# Patient Record
Sex: MALE | Race: WHITE | NOT HISPANIC OR LATINO | Employment: FULL TIME | URBAN - METROPOLITAN AREA
[De-identification: names, ages, dates, MRNs, and addresses within clinical notes are randomized per-mention and may not be internally consistent; named-entity substitution may affect disease eponyms.]

---

## 2023-02-24 LAB
AMPHETAMINE (PRESENCE) IN URINE BY SCREEN METHOD: NORMAL
BARBITURATES PRESENCE IN URINE BY SCREEN METHOD: NORMAL
BENZODIAZEPINE (PRESENCE) IN URINE BY SCREEN METHOD: NORMAL
CANNABINOIDS IN URINE BY SCREEN METHOD: NORMAL
COCAINE (PRESENCE) IN URINE BY SCREEN METHOD: NORMAL
DRUG SCREEN COMMENT URINE: NORMAL
FENTANYL URINE: NORMAL
METHADONE (PRESENCE) IN URINE BY SCREEN METHOD: NORMAL
OPIATES (PRESENCE) IN URINE BY SCREEN METHOD: NORMAL
OXYCODONE (PRESENCE) IN URINE BY SCREEN METHOD: NORMAL
PHENCYCLIDINE (PRESENCE) IN URINE BY SCREEN METHOD: NORMAL

## 2023-02-28 LAB — ETHYL GLUCURONIDE SCREEN: NEGATIVE NG/ML

## 2023-03-01 LAB
6-ACETYLMORPHINE: <25 NG/ML
7-AMINOCLONAZEPAM: <25 NG/ML
ALPHA-HYDROXYALPRAZOLAM: <25 NG/ML
ALPHA-HYDROXYMIDAZOLAM: <25 NG/ML
ALPRAZOLAM: <25 NG/ML
CHLORDIAZEPOXIDE: <25 NG/ML
CLONAZEPAM: <25 NG/ML
CODEINE: <50 NG/ML
DIAZEPAM: <25 NG/ML
HYDROCODONE: <25 NG/ML
HYDROMORPHONE: <25 NG/ML
LORAZEPAM: <25 NG/ML
MIDAZOLAM: <25 NG/ML
MORPHINE URINE: <50 NG/ML
NORDIAZEPAM: <25 NG/ML
NORHYDROCODONE: <25 NG/ML
NOROXYCODONE: <25 NG/ML
OXAZEPAM: <25 NG/ML
OXYCODONE: <25 NG/ML
OXYMORPHONE: <25 NG/ML
TEMAZEPAM: <25 NG/ML

## 2023-03-05 LAB — ETHYL GLUCURONIDE SCREEN: NEGATIVE NG/ML

## 2023-03-13 LAB — ETHYL GLUCURONIDE SCREEN: NEGATIVE NG/ML

## 2023-03-17 LAB — ETHYL GLUCURONIDE SCREEN: NEGATIVE NG/ML

## 2023-03-27 LAB
6-ACETYLMORPHINE: <25 NG/ML
7-AMINOCLONAZEPAM: <25 NG/ML
ALPHA-HYDROXYALPRAZOLAM: <25 NG/ML
ALPHA-HYDROXYMIDAZOLAM: <25 NG/ML
ALPRAZOLAM: <25 NG/ML
CHLORDIAZEPOXIDE: <25 NG/ML
CLONAZEPAM: <25 NG/ML
CODEINE: <50 NG/ML
DIAZEPAM: <25 NG/ML
ETHYL GLUCURONIDE SCREEN: NEGATIVE NG/ML
HYDROCODONE: <25 NG/ML
HYDROMORPHONE: <25 NG/ML
LORAZEPAM: <25 NG/ML
MIDAZOLAM: <25 NG/ML
MORPHINE URINE: <50 NG/ML
NORDIAZEPAM: <25 NG/ML
NORHYDROCODONE: <25 NG/ML
NOROXYCODONE: <25 NG/ML
OXAZEPAM: <25 NG/ML
OXYCODONE: <25 NG/ML
OXYMORPHONE: <25 NG/ML
TEMAZEPAM: <25 NG/ML

## 2023-03-30 LAB
6-ACETYLMORPHINE: <25 NG/ML
7-AMINOCLONAZEPAM: <25 NG/ML
ALPHA-HYDROXYALPRAZOLAM: <25 NG/ML
ALPHA-HYDROXYMIDAZOLAM: <25 NG/ML
ALPRAZOLAM: <25 NG/ML
CHLORDIAZEPOXIDE: <25 NG/ML
CLONAZEPAM: <25 NG/ML
CODEINE: <50 NG/ML
DIAZEPAM: <25 NG/ML
ETHYL GLUCURONIDE SCREEN: POSITIVE NG/ML
HYDROCODONE: <25 NG/ML
HYDROMORPHONE: <25 NG/ML
LORAZEPAM: <25 NG/ML
MIDAZOLAM: <25 NG/ML
MORPHINE URINE: <50 NG/ML
NORDIAZEPAM: <25 NG/ML
NORHYDROCODONE: <25 NG/ML
NOROXYCODONE: <25 NG/ML
OXAZEPAM: <25 NG/ML
OXYCODONE: <25 NG/ML
OXYMORPHONE: <25 NG/ML
TEMAZEPAM: <25 NG/ML

## 2023-04-01 LAB
ETHYL GLUCURONIDE: 1121 NG/ML
ETHYL SULFATE,U: 676 NG/ML

## 2023-04-06 LAB — ETHYL GLUCURONIDE SCREEN: POSITIVE NG/ML

## 2023-04-10 LAB
ETHYL GLUCURONIDE: 774 NG/ML
ETHYL SULFATE,U: 574 NG/ML

## 2023-04-17 LAB — ETHYL GLUCURONIDE SCREEN: NEGATIVE NG/ML

## 2023-04-19 LAB
6-ACETYLMORPHINE: <25 NG/ML
7-AMINOCLONAZEPAM: <25 NG/ML
ALPHA-HYDROXYALPRAZOLAM: <25 NG/ML
ALPHA-HYDROXYMIDAZOLAM: <25 NG/ML
ALPRAZOLAM: <25 NG/ML
AMPHETAMINE (PRESENCE) IN URINE BY SCREEN METHOD: NORMAL
BARBITURATES PRESENCE IN URINE BY SCREEN METHOD: NORMAL
BENZODIAZEPINE (PRESENCE) IN URINE BY SCREEN METHOD: NORMAL
CANNABINOIDS IN URINE BY SCREEN METHOD: NORMAL
CHLORDIAZEPOXIDE: <25 NG/ML
CLONAZEPAM: <25 NG/ML
COCAINE (PRESENCE) IN URINE BY SCREEN METHOD: NORMAL
CODEINE: <50 NG/ML
DIAZEPAM: <25 NG/ML
DRUG SCREEN COMMENT URINE: NORMAL
FENTANYL URINE: NORMAL
HYDROCODONE: <25 NG/ML
HYDROMORPHONE: <25 NG/ML
LORAZEPAM: <25 NG/ML
METHADONE (PRESENCE) IN URINE BY SCREEN METHOD: NORMAL
MIDAZOLAM: <25 NG/ML
MORPHINE URINE: <50 NG/ML
NORDIAZEPAM: <25 NG/ML
NORHYDROCODONE: <25 NG/ML
NOROXYCODONE: <25 NG/ML
OPIATES (PRESENCE) IN URINE BY SCREEN METHOD: NORMAL
OXAZEPAM: <25 NG/ML
OXYCODONE (PRESENCE) IN URINE BY SCREEN METHOD: NORMAL
OXYCODONE: <25 NG/ML
OXYMORPHONE: <25 NG/ML
PHENCYCLIDINE (PRESENCE) IN URINE BY SCREEN METHOD: NORMAL
TEMAZEPAM: <25 NG/ML

## 2023-04-21 LAB — ETHYL GLUCURONIDE SCREEN: NEGATIVE NG/ML

## 2023-05-22 LAB — ETHYL GLUCURONIDE SCREEN: NEGATIVE NG/ML

## 2023-06-26 ENCOUNTER — TELEPHONE (OUTPATIENT)
Dept: PRIMARY CARE | Facility: CLINIC | Age: 43
End: 2023-06-26
Payer: COMMERCIAL

## 2023-06-26 DIAGNOSIS — Z00.00 LABORATORY TESTS ORDERED AS PART OF A COMPLETE PHYSICAL EXAM (CPE): ICD-10-CM

## 2023-06-26 LAB — ETHYL GLUCURONIDE SCREEN: NEGATIVE NG/ML

## 2023-07-06 ENCOUNTER — APPOINTMENT (OUTPATIENT)
Dept: PRIMARY CARE | Facility: CLINIC | Age: 43
End: 2023-07-06
Payer: COMMERCIAL

## 2023-07-11 ENCOUNTER — LAB (OUTPATIENT)
Dept: LAB | Facility: LAB | Age: 43
End: 2023-07-11
Payer: COMMERCIAL

## 2023-07-11 DIAGNOSIS — Z00.00 LABORATORY TESTS ORDERED AS PART OF A COMPLETE PHYSICAL EXAM (CPE): ICD-10-CM

## 2023-07-11 LAB
ALANINE AMINOTRANSFERASE (SGPT) (U/L) IN SER/PLAS: 24 U/L (ref 10–52)
ALBUMIN (G/DL) IN SER/PLAS: 4.6 G/DL (ref 3.4–5)
ALKALINE PHOSPHATASE (U/L) IN SER/PLAS: 45 U/L (ref 33–120)
ANION GAP IN SER/PLAS: 14 MMOL/L (ref 10–20)
ASPARTATE AMINOTRANSFERASE (SGOT) (U/L) IN SER/PLAS: 22 U/L (ref 9–39)
BILIRUBIN TOTAL (MG/DL) IN SER/PLAS: 0.6 MG/DL (ref 0–1.2)
CALCIUM (MG/DL) IN SER/PLAS: 9.4 MG/DL (ref 8.6–10.6)
CARBON DIOXIDE, TOTAL (MMOL/L) IN SER/PLAS: 25 MMOL/L (ref 21–32)
CHLORIDE (MMOL/L) IN SER/PLAS: 102 MMOL/L (ref 98–107)
CHOLESTEROL (MG/DL) IN SER/PLAS: 258 MG/DL (ref 0–199)
CHOLESTEROL IN HDL (MG/DL) IN SER/PLAS: 59.9 MG/DL
CHOLESTEROL/HDL RATIO: 4.3
CREATININE (MG/DL) IN SER/PLAS: 0.94 MG/DL (ref 0.5–1.3)
ERYTHROCYTE DISTRIBUTION WIDTH (RATIO) BY AUTOMATED COUNT: 14 % (ref 11.5–14.5)
ERYTHROCYTE MEAN CORPUSCULAR HEMOGLOBIN CONCENTRATION (G/DL) BY AUTOMATED: 33 G/DL (ref 32–36)
ERYTHROCYTE MEAN CORPUSCULAR VOLUME (FL) BY AUTOMATED COUNT: 85 FL (ref 80–100)
ERYTHROCYTES (10*6/UL) IN BLOOD BY AUTOMATED COUNT: 5.61 X10E12/L (ref 4.5–5.9)
ESTIMATED AVERAGE GLUCOSE FOR HBA1C: 108 MG/DL
GFR MALE: >90 ML/MIN/1.73M2
GLUCOSE (MG/DL) IN SER/PLAS: 92 MG/DL (ref 74–99)
HEMATOCRIT (%) IN BLOOD BY AUTOMATED COUNT: 47.9 % (ref 41–52)
HEMOGLOBIN (G/DL) IN BLOOD: 15.8 G/DL (ref 13.5–17.5)
HEMOGLOBIN A1C/HEMOGLOBIN TOTAL IN BLOOD: 5.4 %
LDL: 164 MG/DL (ref 0–99)
LEUKOCYTES (10*3/UL) IN BLOOD BY AUTOMATED COUNT: 4.9 X10E9/L (ref 4.4–11.3)
NRBC (PER 100 WBCS) BY AUTOMATED COUNT: 0 /100 WBC (ref 0–0)
PLATELETS (10*3/UL) IN BLOOD AUTOMATED COUNT: 183 X10E9/L (ref 150–450)
POTASSIUM (MMOL/L) IN SER/PLAS: 4 MMOL/L (ref 3.5–5.3)
PROTEIN TOTAL: 6.9 G/DL (ref 6.4–8.2)
SODIUM (MMOL/L) IN SER/PLAS: 137 MMOL/L (ref 136–145)
THYROTROPIN (MIU/L) IN SER/PLAS BY DETECTION LIMIT <= 0.05 MIU/L: 1.59 MIU/L (ref 0.44–3.98)
TRIGLYCERIDE (MG/DL) IN SER/PLAS: 170 MG/DL (ref 0–149)
UREA NITROGEN (MG/DL) IN SER/PLAS: 17 MG/DL (ref 6–23)
VLDL: 34 MG/DL (ref 0–40)

## 2023-07-11 PROCEDURE — 80061 LIPID PANEL: CPT

## 2023-07-11 PROCEDURE — 83036 HEMOGLOBIN GLYCOSYLATED A1C: CPT

## 2023-07-11 PROCEDURE — 36415 COLL VENOUS BLD VENIPUNCTURE: CPT

## 2023-07-11 PROCEDURE — 85027 COMPLETE CBC AUTOMATED: CPT

## 2023-07-11 PROCEDURE — 80053 COMPREHEN METABOLIC PANEL: CPT

## 2023-07-11 PROCEDURE — 84443 ASSAY THYROID STIM HORMONE: CPT

## 2023-07-19 ENCOUNTER — OFFICE VISIT (OUTPATIENT)
Dept: PRIMARY CARE | Facility: CLINIC | Age: 43
End: 2023-07-19
Payer: COMMERCIAL

## 2023-07-19 VITALS
BODY MASS INDEX: 32.64 KG/M2 | HEART RATE: 68 BPM | TEMPERATURE: 98.2 F | RESPIRATION RATE: 18 BRPM | DIASTOLIC BLOOD PRESSURE: 86 MMHG | OXYGEN SATURATION: 97 % | WEIGHT: 215.4 LBS | HEIGHT: 68 IN | SYSTOLIC BLOOD PRESSURE: 126 MMHG

## 2023-07-19 DIAGNOSIS — Z00.00 ENCOUNTER FOR ANNUAL PHYSICAL EXAM: ICD-10-CM

## 2023-07-19 DIAGNOSIS — G43.009 MIGRAINE WITHOUT AURA AND WITHOUT STATUS MIGRAINOSUS, NOT INTRACTABLE: ICD-10-CM

## 2023-07-19 DIAGNOSIS — Z12.11 COLON CANCER SCREENING: Primary | ICD-10-CM

## 2023-07-19 DIAGNOSIS — I51.7 LVH (LEFT VENTRICULAR HYPERTROPHY): ICD-10-CM

## 2023-07-19 DIAGNOSIS — E78.2 MIXED HYPERLIPIDEMIA: ICD-10-CM

## 2023-07-19 PROCEDURE — 99396 PREV VISIT EST AGE 40-64: CPT | Performed by: FAMILY MEDICINE

## 2023-07-19 PROCEDURE — 1036F TOBACCO NON-USER: CPT | Performed by: FAMILY MEDICINE

## 2023-07-19 NOTE — PROGRESS NOTES
"Subjective   Osorio Gómez III is a 43 y.o. male who presents for Annual Exam (Pt being seen for complete physical exam.).  HPI  PMH:  Bicuspid aortic valve Dx in HS   L inguinal hernia repair  Achilles tendon rupture   Ca score 0, 8/2022  Mild LVH-echo 8/2022 cards eval. Rec rpt few yrs   Migraines-neg MRI 8/2022, neuro eval   wife, AUGUSTA Verduzco  4 kid    Wants to lose 20 lb. Works out 3 times a week  Drinks on weekends. Was getting drug tested for issue in the past   Worried about cholesterol   Mood is good  No immed Fhx colon prostate or melanoma ca  Migraines worse when on the computer   No current outpatient medications on file prior to visit.     No current facility-administered medications on file prior to visit.                  Objective   /86   Pulse 68   Temp 36.8 °C (98.2 °F)   Resp 18   Ht 1.715 m (5' 7.52\")   Wt 97.7 kg (215 lb 6.4 oz)   SpO2 97%   BMI 33.22 kg/m²    Physical Exam  General: NAD  HEENT:NCAT, PERRLA, nml OP, b/l TM clear   Neck: no cervical ALLISON  Heart: RRR no murmur, no edema   Lungs: CTA b/l, no wheeze or rhonchi   GI: abd soft, nontender, nondistended.   MSK: no c/c/e. nml gait   Skin: warm and dry  Psych: cooperative, appropriate affect  Neuro: speech clear. A&Ox3  Assessment/Plan   Problem List Items Addressed This Visit    None  Visit Diagnoses       Colon cancer screening    -  Primary    Relevant Orders    Colonoscopy    Mixed hyperlipidemia      -ASCVD 3.2 w Ca score 0 2022. Statin not indicated but better if LDL <160. \-mmotivated to lose wt and make diet changes  -rpt lipids in 3 mo       Relevant Orders    Lipid Panel    Encounter for annual physical exam      CPE:overall doing well. Discussed diet/ex changes  BMI: 33  VACC: reviewed, tdap UTD  COLON CA SCRN: RF c-scope  LABS: reviewed w pt at goal besides lipids  Prostate ca scrn: PSA at 53  RTC yearly or prn for CPE w labs prior     Relevant Orders    CBC and Auto Differential    Comprehensive Metabolic Panel    " Hemoglobin A1C    Lipid Panel    TSH with reflex to Free T4 if abnormal    Migraines: neg neuro w/up including MRI  -exac by screens, rec blue light glasses    BP on recheck much improved    LVH: mild, rpt echo in 2-3 yrs per cards

## 2023-09-29 ENCOUNTER — TELEMEDICINE (OUTPATIENT)
Dept: PRIMARY CARE | Facility: CLINIC | Age: 43
End: 2023-09-29
Payer: COMMERCIAL

## 2023-09-29 DIAGNOSIS — L03.119 CELLULITIS, UPPER ARM: Primary | ICD-10-CM

## 2023-09-29 PROCEDURE — 99213 OFFICE O/P EST LOW 20 MIN: CPT | Performed by: FAMILY MEDICINE

## 2023-09-29 RX ORDER — SULFAMETHOXAZOLE AND TRIMETHOPRIM 800; 160 MG/1; MG/1
1 TABLET ORAL 2 TIMES DAILY
Qty: 14 TABLET | Refills: 0 | Status: SHIPPED | OUTPATIENT
Start: 2023-09-29 | End: 2023-10-06

## 2023-09-29 NOTE — PROGRESS NOTES
Subjective   Osorio Gómez III is a 43 y.o. male who presents for No chief complaint on file..  HPI  An interactive audio and video telecommunication system which permits real time communications between the patient  (at the originating site) and provider (at the distant site) was utilized to provide this telehealth service. Verbal consent was requested and obtained today for a telehealth visit.    Bee sting 2 day ago L elbow area, getting swollen and itchy. No prevous Hx of rxn to bee stings, no fever, sob, tongue swelling, no streaking. Slight pain   No current outpatient medications on file prior to visit.     No current facility-administered medications on file prior to visit.                  Objective   There were no vitals taken for this visit.   Physical Exam  General: NAD  HEENT:NCAT  Lungs: no audible wheeze or rhonchi   Skin: L ELBOW AREA W ERYTHEMA AND SWELLING, NO VISIBLE STREAKING   Psych: cooperative, appropriate affect  Neuro: speech clear. A&Ox3    Assessment/Plan   Problem List Items Addressed This Visit    None  Visit Diagnoses       Cellulitis, upper arm    -  Primary  -bee sting concern for cellulitis also allergic component  -start bactrim DS BID x 7 d  -benadryl +/- zyrtec  -topical benadryl and hydrocort cream     Relevant Medications    sulfamethoxazole-trimethoprim (Bactrim DS) 800-160 mg tablet

## 2023-12-08 DIAGNOSIS — Z12.11 COLON CANCER SCREENING: ICD-10-CM

## 2023-12-08 RX ORDER — POLYETHYLENE GLYCOL 3350, SODIUM SULFATE ANHYDROUS, SODIUM BICARBONATE, SODIUM CHLORIDE, POTASSIUM CHLORIDE 236; 22.74; 6.74; 5.86; 2.97 G/4L; G/4L; G/4L; G/4L; G/4L
4000 POWDER, FOR SOLUTION ORAL ONCE
Qty: 4000 ML | Refills: 0 | Status: SHIPPED | OUTPATIENT
Start: 2023-12-08 | End: 2023-12-08

## 2024-01-24 ENCOUNTER — OFFICE VISIT (OUTPATIENT)
Dept: PRIMARY CARE | Facility: CLINIC | Age: 44
End: 2024-01-24
Payer: COMMERCIAL

## 2024-01-24 VITALS
OXYGEN SATURATION: 95 % | BODY MASS INDEX: 32.33 KG/M2 | WEIGHT: 206 LBS | SYSTOLIC BLOOD PRESSURE: 131 MMHG | RESPIRATION RATE: 16 BRPM | DIASTOLIC BLOOD PRESSURE: 92 MMHG | HEIGHT: 67 IN | TEMPERATURE: 97.8 F | HEART RATE: 93 BPM

## 2024-01-24 DIAGNOSIS — B96.89 ACUTE BACTERIAL BRONCHITIS: Primary | ICD-10-CM

## 2024-01-24 DIAGNOSIS — J20.8 ACUTE BACTERIAL BRONCHITIS: Primary | ICD-10-CM

## 2024-01-24 PROCEDURE — 1036F TOBACCO NON-USER: CPT | Performed by: FAMILY MEDICINE

## 2024-01-24 PROCEDURE — 99213 OFFICE O/P EST LOW 20 MIN: CPT | Performed by: FAMILY MEDICINE

## 2024-01-24 RX ORDER — AZITHROMYCIN 250 MG/1
TABLET, FILM COATED ORAL
Qty: 6 TABLET | Refills: 0 | Status: SHIPPED | OUTPATIENT
Start: 2024-01-24 | End: 2024-01-29

## 2024-01-24 RX ORDER — METHYLPREDNISOLONE 4 MG/1
TABLET ORAL
Qty: 21 TABLET | Refills: 0 | Status: SHIPPED | OUTPATIENT
Start: 2024-01-24 | End: 2024-01-31

## 2024-01-24 NOTE — PROGRESS NOTES
"Subjective   Osorio Gómez III is a 43 y.o. male who presents for Cough (For 3 weeks ), Nasal Congestion, and Chest Pain (Pressure in chest ).  HPI  Cough and fever started 3 wk ago, cough is nagging. No sob. Worked out a few  times. No OTC meds in 2 wk   No current outpatient medications on file prior to visit.     No current facility-administered medications on file prior to visit.                  Objective   BP (!) 131/92 (BP Location: Left arm)   Pulse 93   Temp 36.6 °C (97.8 °F)   Resp 16   Ht 1.702 m (5' 7\")   Wt 93.4 kg (206 lb)   SpO2 95%   BMI 32.26 kg/m²    Physical Exam  General: NAD  HEENT:NCAT, PERRLA, nml OP, bl TM clear. NT Edema, no sinus pain   Neck: no cervical ALLISON  Heart: RRR no murmur, no edema   Lungs: CTA b/l, no wheeze or rhonchi   MSK: no c/c/e. nml gait   Skin: warm and dry  Psych: cooperative, appropriate affect  Neuro: speech clear. A&Ox3  Assessment/Plan   Problem List Items Addressed This Visit    None  Visit Diagnoses       Acute bacterial bronchitis    -  Primary  Persistent cough x 4 wk concerning for bronchitis vs atyp PNA  Zpack and medrol   CXR if no improvement       Relevant Medications    azithromycin (Zithromax) 250 mg tablet    methylPREDNISolone (Medrol Dospak) 4 mg tablets            "

## 2024-02-07 ENCOUNTER — PATIENT MESSAGE (OUTPATIENT)
Dept: PRIMARY CARE | Facility: CLINIC | Age: 44
End: 2024-02-07
Payer: COMMERCIAL

## 2024-03-07 ENCOUNTER — HOSPITAL ENCOUNTER (OUTPATIENT)
Dept: RADIOLOGY | Facility: HOSPITAL | Age: 44
Discharge: HOME | End: 2024-03-07
Payer: COMMERCIAL

## 2024-03-07 DIAGNOSIS — R05.3 CHRONIC COUGH: ICD-10-CM

## 2024-03-07 PROCEDURE — 71046 X-RAY EXAM CHEST 2 VIEWS: CPT | Performed by: RADIOLOGY

## 2024-03-07 PROCEDURE — 71046 X-RAY EXAM CHEST 2 VIEWS: CPT

## 2024-03-08 ENCOUNTER — TELEPHONE (OUTPATIENT)
Dept: PRIMARY CARE | Facility: CLINIC | Age: 44
End: 2024-03-08
Payer: COMMERCIAL

## 2024-03-08 DIAGNOSIS — J18.9 MULTIFOCAL PNEUMONIA: Primary | ICD-10-CM

## 2024-03-08 RX ORDER — LEVOFLOXACIN 500 MG/1
500 TABLET, FILM COATED ORAL
Qty: 10 TABLET | Refills: 0 | Status: SHIPPED | OUTPATIENT
Start: 2024-03-08 | End: 2024-03-18

## 2024-03-09 NOTE — TELEPHONE ENCOUNTER
CXR w multifocal PNA  Called pt LVM  Called wife, informed of findings. Pt has cough and night sweats but no extreme sob. able to work  Advised to monitor O2 and monitor for red flag Sx   Sent in levaquin 500 daily for 10 d   Rpt CXR 2-3 wk

## 2024-03-11 ENCOUNTER — APPOINTMENT (OUTPATIENT)
Dept: GASTROENTEROLOGY | Facility: EXTERNAL LOCATION | Age: 44
End: 2024-03-11
Payer: COMMERCIAL

## 2024-03-12 ENCOUNTER — TELEPHONE (OUTPATIENT)
Dept: PRIMARY CARE | Facility: CLINIC | Age: 44
End: 2024-03-12
Payer: COMMERCIAL

## 2024-03-12 NOTE — TELEPHONE ENCOUNTER
Contacted pt on 3/9 prior to starting levaquin. Having night sweats and cough but no extreme SOB. O2 around 94. Was at his son basketball game.    Contacted wife 3/11. Feeling better since starting abx. Call w changes/concerns

## 2024-03-19 ENCOUNTER — TELEPHONE (OUTPATIENT)
Dept: PRIMARY CARE | Facility: CLINIC | Age: 44
End: 2024-03-19
Payer: COMMERCIAL

## 2024-03-19 NOTE — TELEPHONE ENCOUNTER
----- Message from Mita Matos CMA sent at 3/19/2024 11:54 AM EDT -----  Regarding: FW: Follow up  Contact: [phone]    ----- Message -----  From: Osorio Gómez III  Sent: 3/19/2024   9:36 AM EDT  To: [facility] [place] [id number] Clinical Support Staff  Subject: Follow up                                        Hi Dr. Behm! Just wanted to to talk about my progress and next steps. I feel much better so just had a few questions moving forward    Thanks  Edouard Gómez  [phone]

## 2024-03-19 NOTE — TELEPHONE ENCOUNTER
Called pt  Feeling better from PNA. Went to gym today. Completed levaquin 500 x 10 d   Occ cough  Still get f/up CXR next wk

## 2024-03-22 ENCOUNTER — HOSPITAL ENCOUNTER (OUTPATIENT)
Dept: RADIOLOGY | Facility: HOSPITAL | Age: 44
Discharge: HOME | End: 2024-03-22
Payer: COMMERCIAL

## 2024-03-22 DIAGNOSIS — J18.9 MULTIFOCAL PNEUMONIA: ICD-10-CM

## 2024-03-22 PROCEDURE — 71046 X-RAY EXAM CHEST 2 VIEWS: CPT

## 2024-03-22 PROCEDURE — 71046 X-RAY EXAM CHEST 2 VIEWS: CPT | Performed by: RADIOLOGY

## 2024-03-25 ENCOUNTER — TELEPHONE (OUTPATIENT)
Dept: PRIMARY CARE | Facility: CLINIC | Age: 44
End: 2024-03-25
Payer: COMMERCIAL

## 2024-03-25 DIAGNOSIS — J18.9 MULTIFOCAL PNEUMONIA: Primary | ICD-10-CM

## 2024-03-25 NOTE — TELEPHONE ENCOUNTER
Called pt  CXR still w PNA but clinically MUCH improved  Will give more time sine xray can lag clinically   Rpt CXR in 1 wk   If no improvement will get CT

## 2024-03-28 ENCOUNTER — TELEPHONE (OUTPATIENT)
Dept: PRIMARY CARE | Facility: CLINIC | Age: 44
End: 2024-03-28
Payer: COMMERCIAL

## 2024-03-28 DIAGNOSIS — J18.9 MULTIFOCAL PNEUMONIA: Primary | ICD-10-CM

## 2024-03-28 NOTE — TELEPHONE ENCOUNTER
After further consideration pt/wife requested to move fwd w CT which is appropriate given severity of PNA and no resolution on previous CXR

## 2024-04-09 ENCOUNTER — OFFICE VISIT (OUTPATIENT)
Dept: GASTROENTEROLOGY | Facility: EXTERNAL LOCATION | Age: 44
End: 2024-04-09
Payer: COMMERCIAL

## 2024-04-09 DIAGNOSIS — Z12.11 COLON CANCER SCREENING: ICD-10-CM

## 2024-04-09 DIAGNOSIS — K64.8 INTERNAL HEMORRHOIDS: Primary | ICD-10-CM

## 2024-04-09 DIAGNOSIS — K57.30 DIVERTICULOSIS OF LARGE INTESTINE WITHOUT DIVERTICULITIS: ICD-10-CM

## 2024-04-09 PROCEDURE — 45378 DIAGNOSTIC COLONOSCOPY: CPT | Performed by: INTERNAL MEDICINE

## 2024-04-13 ENCOUNTER — HOSPITAL ENCOUNTER (OUTPATIENT)
Dept: RADIOLOGY | Facility: CLINIC | Age: 44
Discharge: HOME | End: 2024-04-13
Payer: COMMERCIAL

## 2024-04-13 DIAGNOSIS — J18.9 MULTIFOCAL PNEUMONIA: ICD-10-CM

## 2024-04-13 PROCEDURE — 71250 CT THORAX DX C-: CPT | Performed by: RADIOLOGY

## 2024-04-13 PROCEDURE — 71250 CT THORAX DX C-: CPT

## 2024-04-16 ENCOUNTER — TELEPHONE (OUTPATIENT)
Dept: PRIMARY CARE | Facility: CLINIC | Age: 44
End: 2024-04-16
Payer: COMMERCIAL

## 2024-04-16 DIAGNOSIS — J18.9 MULTIFOCAL PNEUMONIA: Primary | ICD-10-CM

## 2024-04-16 NOTE — TELEPHONE ENCOUNTER
Called wife and patient   Sx improved overall but still w occ cough  PNA changes seen.   Will RF to pulm     Micronodule noted, low risk pt nonsmoker. Consider rpt CT 1 yr     Diverticulosis seen. Benign, no constipation

## 2024-04-26 DIAGNOSIS — J18.9 PNEUMONIA OF BOTH LUNGS DUE TO INFECTIOUS ORGANISM, UNSPECIFIED PART OF LUNG: Primary | ICD-10-CM

## 2024-04-26 RX ORDER — DOXYCYCLINE 100 MG/1
100 TABLET ORAL 2 TIMES DAILY
COMMUNITY
End: 2024-04-26 | Stop reason: SDUPTHER

## 2024-04-26 NOTE — PROGRESS NOTES
Patient: Osorio Gómez III    [id number]  : 1980 -- AGE 43 y.o.    Provider: Capri CHAPARRO [facility]     Location [facility]   Service Date: 24              [facility]  New Visit Note      HISTORY OF PRESENT ILLNESS     The patient's referring provider is: Behm, Brittany, DO    HISTORY OF PRESENT ILLNESS   Osorio Gómez III is a 43 y.o. male with no prior medical history, who is a never smoker, who presents to a [facility] for an initial/follow up evaluation for multifocal pneumonia and chronic cough.      I have independently interviewed and examined the patient in the office and reviewed available records.    Current History    On today's visit, the patient reports since the first of the year states he has had:  24: Zpack and medrol dose for cough x 3 weeks  3/8/34: pneumonia on CXR levaquin 500 mg for 10 days.   24: Cough returned started Doxy 100 mg. CT Chest.     He is accompanied by his wife today who claims he always has a dry nagging cough.  She reports that he coughs in his sleep.  He denies coughing or choking on food or liquids.  Occasional chest tightness  Denies fevers/chills, shortness of breath, emergency room visits or breathing issues  Has occasional GERD states with busy lifestyle sometimes he goes too long without eating and then will noticed acid reflux in the evening.  Denies joint swelling, redness and stiffness.    Bicuspid aortic valve    Previous pulmonary history: He has no history of recurrent infections, or lung disease as a child.  He had no previous lung hx, never on oxygen or inhaler therapy.     Inhalers/nebulized medications: never    Hospitalization History: He has not been hospitalized over the last year for breathing related problem.    Sleep history: Sleep study showed mild sleep apnea, does not wear CPAP    ALLERGIES AND MEDICATIONS     ALLERGIES  No Known  Allergies    MEDICATIONS  No current outpatient medications on file.     No current facility-administered medications for this visit.         PAST HISTORY     PAST MEDICAL HISTORY      PAST SURGICAL HISTORY  Past Surgical History:   Procedure Laterality Date    HERNIA REPAIR  03/22/2016    Hernia Repair    MR HEAD ANGIO WO IV CONTRAST  12/23/2022    MR HEAD ANGIO WO IV CONTRAST 12/23/2022 [facility]    OTHER SURGICAL HISTORY  08/23/2019    Achilles tendon surgery       IMMUNIZATION HISTORY    There is no immunization history on file for this patient.    SOCIAL HISTORY  Tobacco Smoking: never. No vaping.  Illicit drugs: none  Alcohol consumption: weekends drinks beer  Pets: none    OCCUPATIONAL/ENVIRONMENTAL HISTORY  Occupation:  for [employer].  No known exposure to asbestos, silica, beryllium or inhaled metals.  No exposure to birds or exotic animals.    FAMILY HISTORY    Mother- emphysema and COPD   No family history of cancer.  No family history of autoimmune disorders.    REVIEW OF SYSTEMS     REVIEW OF SYSTEMS  Review of Systems    Constitutional: No fever, no chills, no night sweats.    Eyes: No double vision, no floaters, no dry eyes.   ENT: See HPI.   Neck: No neck stiffness.  Cardiovascular: No sharp chest pain, no heart racing, no leg swelling.  Respiratory: as noted in HPI.   Gastrointestinal: No nausea, no vomiting, no diarrhea.   Musculoskeletal: No joint pain, no back pain.   Integumentary: No rashes or sores.  Neurological: No dizziness, no headaches. Sleeping well.  Psychiatric: No mood changes.   Endocrine: No hot flashes, no cold intolerance, weight is stable.  Hematologic: No easy bruising or bleeding.    PHYSICAL EXAM     VITAL SIGNS:   Vitals:    05/02/24 0844   BP: 124/85   Pulse: 74   Temp: 36.8 °C (98.2 °F)   SpO2: 96%         CURRENT WEIGHT: Body mass index is 31.93 kg/m².    PREVIOUS WEIGHTS:  Wt Readings from Last 3 Encounters:   01/24/24 93.4 kg (206 lb)   07/19/23  97.7 kg (215 lb 6.4 oz)   11/21/22 99.5 kg (219 lb 6 oz)       Physical Exam    Constitutional: General appearance: Alert and oriented.  No acute distress. Well developed, well nourished.  Head and face: Symmetric  ENT: external inspection of ear and nose normal. No intranasal polyps. No oropharyngeal exudates.    Oropharynx: normal   Neck: supple, no lymphadenopathy  Pulmonary: Chest is normal. No increased work of breathing or signs of respiratory distress. Crackles mid and lower lobes bilaterally - no wheezing, or rhonchi.   Cardiovascular: Heart rate and rhythm normal. Normal S1, S2 - no murmurs, gallops, or pericardial rub.   Abdomen: Soft, non tender, +BS  Extremities: No edema. No clubbing or cyanosis of the fingernails.    Neurologic: Moves all four extremities   MSK: Normal movements of extremities. Gait normal   Psychiatric: Intact judgement and insight.    RESULTS/DATA     Pulmonary Function Test Results     No testing done.    Chest Radiograph     XR chest 2 views 03/22/2024    Narrative  Interpreted By:  Thanh Benavidez,  STUDY:  XR CHEST 2 VIEWS    INDICATION:  Signs/Symptoms:PNA f/up.    COMPARISON:  March 7    ACCESSION NUMBER(S):  [id number]    ORDERING CLINICIAN:  BRITTANY BEHM    FINDINGS:  Bilateral basilar multifocal opacities consistent with pneumonia  unchanged from prior study.    No effusion or pneumothorax.    Impression  Bilateral multifocal basilar pneumonia unchanged.    Signed by: Thanh Benavidez 3/23/2024 4:39 PM  Dictation workstation:   [id number]      Chest CT Scan     Study Date:       8/5/2022             Referring Physician: BRITTANY BEHM  MRN/PID:          [id number]             PCP:  Accession/Order#: [id number]         Department Location: [facility]                                                              Echo Lab  YOB: 1980             Fellow:  Gender:           M                    Nurse:  Admit Date:                            Sonographer:          Barbara Guerrier 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     Study Detail: The following Echo studies were performed: 2D, M-Mode, Doppler and                color flow.        PHYSICIAN INTERPRETATION:  Left Ventricle: The left ventricular systolic function is hyperdynamic, with an estimated ejection fraction of 70-75%. There are no regional wall motion abnormalities. The left ventricular cavity size is normal. The left ventricular septal wall thickness is moderately increased. There is mildly increased left ventricular posterior wall thickness. There is mild to moderate concentric left ventricular hypertrophy. Spectral Doppler shows a normal pattern of left ventricular diastolic filling. There is a left ventricular outflow tract obstruction with the Valsalva maneuver. The provoked gradient is 29 mmHg.  Left Atrium: The left atrium is normal in size.  Right Ventricle: The right ventricle is normal in size. There is normal right ventricular global systolic function.  Right Atrium: The right atrium is upper limits of normal in size.  Aortic Valve: The aortic valve appears abnormal. The number of aortic cusps could not be determined from this study. There are increased aortic valve velocities due to increased flow/dynamic ejection. There is trace to mild aortic valve regurgitation. The peak instantaneous gradient of the aortic valve is 11.9 mmHg.  The mean gradient of the aortic valve is 6.5 mmHg. The aorta is normal.  Mitral Valve: The mitral valve is normal in structure. There is mild systolic anterior motion with obstructive septal contact. There is trace mitral valve regurgitation.  Tricuspid Valve: The tricuspid valve is structurally normal. There is trace tricuspid regurgitation. The right ventricular systolic pressure is unable to be estimated.  Pulmonic Valve: The pulmonic valve is structurally normal. There is physiologic pulmonic valve regurgitation.  Pericardium: There is a trivial pericardial effusion.  Aorta: The aortic root is normal.  In comparison to the previous echocardiogram(s): There are no prior studies on this patient for comparison purposes.        CONCLUSIONS:   1. The left ventricular systolic function is hyperdynamic with a 70-75% estimated ejection fraction.   2. Poorly visualized anatomical structures due to suboptimal image quality.   3. Moderately increased left ventricular septal thickness.   4. The aorta is normal.     QUANTITATIVE DATA SUMMARY:  2D MEASUREMENTS:                            Normal Ranges:  LAs:           4.47 cm    (2.7-4.0cm)  RVIDd:         1.85 cm    (0.9-3.6cm)  IVSd:          1.74 cm    (0.6-1.1cm)  LVPWd:         1.46 cm    (0.6-1.1cm)  LVIDd:         4.12 cm    (3.9-5.9cm)  LVIDs:         2.19 cm  LV Mass Index: 128.9 g/m2  LV % FS        46.9 %     LA VOLUME:                                Normal Ranges:  LA Vol A4C:        63.8 ml    (22+/-6mL/m2)  LA Vol A2C:        32.0 ml  LA Vol BP:         45.9 ml  LA Vol Index A4C:  30.6 ml/m2  LA Vol Index A2C:  15.3 ml/m2  LA Vol Index BP:   22.0 ml/m2  LA Area A4C:       21.4 cm2  LA Area A2C:       14.9 cm2  LA Major Axis A4C: 6.1 cm  LA Major Axis A2C: 5.9 cm  LA Volume Index:   22.0 ml/m2  LA Vol A4C:        58.7 ml  LA Vol A2C:        31.6 ml     RA VOLUME BY A/L METHOD:                                Normal Ranges:  RA Vol A4C:        55.6 ml     (8.3-19.5ml)  RA Vol Index A4C:  26.7 ml/m2  RA Area A4C:       18.8 cm2  RA Major Axis A4C: 5.4 cm     M-MODE MEASUREMENTS:                   Normal Ranges:  Ao Root: 3.10 cm (2.0-3.7cm)  LAs:     5.16 cm (2.7-4.0cm)     AORTA MEASUREMENTS:                     Normal Ranges:  Asc Ao, d: 2.80 cm (2.1-3.4cm)     LV SYSTOLIC FUNCTION BY 2D PLANIMETRY (MOD):                      Normal Ranges:  EF-A4C View: 67.7 % (>55%)  EF-A2C View: 68.2 %     LV DIASTOLIC FUNCTION:                                Normal Ranges:  MV Peak E:        0.52 m/s    (0.7-1.2 m/s)  MV Peak A:        0.57 m/s    (0.42-0.7 m/s)  E/A Ratio:        0.90        (1.0-2.2)  MV e'             0.09 m/s    (>8.0)  MV lateral e'     0.09 m/s  MV medial e'      0.06 m/s  MV A Dur:         108.47 msec  E/e' Ratio:       5.74        (<8.0)  MV DT:            180 msec    (150-240 msec)  PulmV Sys Ari:    40.67 cm/s  PulmV Fontaine Ari:   41.62 cm/s  PulmV S/D Ari:    0.98  PulmV A Revs Ari: 38.85 cm/s  PulmV A Revs Dur: 152.24 msec     MITRAL VALVE:                       Normal Ranges:  MV DT:      180 msec (150-240msec)  MV PHT:     74 msec  (30-60msec)  MVA by PHT: 2.97 cm2 (4-6cm2)     AORTIC VALVE:                                     Normal Ranges:  AoV Vmax:                1.72 m/s  (<1.7m/s)  AoV Peak P.9 mmHg (<20mmHg)  AoV Mean P.5 mmHg  (1.7-11.5mmHg)  LVOT Max Ari:            1.78 m/s  (<1.1m/s)  AoV VTI:                 34.29 cm  (18-25cm)  LVOT VTI:                40.62 cm  LVOT Diameter:           2.23 cm   (1.8-2.4cm)  AoV Area, VTI:           4.61 cm2  (2.5-5.5cm2)  AoV Area,Vmax:           4.01 cm2  (2.5-4.5cm2)  AoV Dimensionless Index: 1.18     RIGHT VENTRICLE:  RV 1   3.5 cm  RV 2   1.09 cm  RV 3   7.6 cm  TAPSE: 24.1 mm  RV s'  0.10 m/s     TRICUSPID VALVE/RVSP:                    Normal Ranges:  IVC Diam: 2.30 cm     PULMONIC VALVE:                          Normal Ranges:  PV Accel Time: 148 msec  (>120ms)  PV Max Ari:    0.9 m/s  (0.6-0.9m/s)  PV Max PG:     3.2 mmHg     Pulmonary Veins:  PulmV A Revs Dur: 152.24 msec  PulmV A Revs Ari: 38.85 cm/s  PulmV Fontaine Ari:   41.62 cm/s  PulmV S/D Ari:    0.98  PulmV Sys Ari:    40.67 cm/s     AORTA:  Asc Ao Diam 2.75 cm        [id number] Gutierrez Posey MD  Electronically signed on 8/5/2022 at 10:18:06 AM           Final     Echocardiogram     Study Date:       8/5/2022             Referring Physician: BRITTANY BEHM  MRN/PID:          [id number]             PCP:  Accession/Order#: [id number]         Department Location: [facility]                                                              Echo Lab  YOB: 1980             Fellow:  Gender:           M                    Nurse:  Admit Date:                            Sonographer:         Barbara Guerrier [facility]  Admission Status: Outpatient           Additional Staff:  Height:           170.18 cm            CC Report to:  Weight:           97.52 kg             Study Type:          Echocardiogram  BSA:              2.09 m2  Blood Pressure: 115 /82 mmHg     Diagnosis/ICD: 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     Study Detail: The following Echo studies were performed: 2D, M-Mode, Doppler and                color flow.        PHYSICIAN INTERPRETATION:  Left Ventricle: The left ventricular systolic function is hyperdynamic, with an estimated ejection fraction of 70-75%. There are no regional wall motion abnormalities. The left ventricular cavity size is normal. The left ventricular septal wall thickness is moderately increased. There is mildly increased left ventricular posterior wall thickness.  There is mild to moderate concentric left ventricular hypertrophy. Spectral Doppler shows a normal pattern of left ventricular diastolic filling. There is a left ventricular outflow tract obstruction with the Valsalva maneuver. The provoked gradient is 29 mmHg.  Left Atrium: The left atrium is normal in size.  Right Ventricle: The right ventricle is normal in size. There is normal right ventricular global systolic function.  Right Atrium: The right atrium is upper limits of normal in size.  Aortic Valve: The aortic valve appears abnormal. The number of aortic cusps could not be determined from this study. There are increased aortic valve velocities due to increased flow/dynamic ejection. There is trace to mild aortic valve regurgitation. The peak instantaneous gradient of the aortic valve is 11.9 mmHg. The mean gradient of the aortic valve is 6.5 mmHg. The aorta is normal.  Mitral Valve: The mitral valve is normal in structure. There is mild systolic anterior motion with obstructive septal contact. There is trace mitral valve regurgitation.  Tricuspid Valve: The tricuspid valve is structurally normal. There is trace tricuspid regurgitation. The right ventricular systolic pressure is unable to be estimated.  Pulmonic Valve: The pulmonic valve is structurally normal. There is physiologic pulmonic valve regurgitation.  Pericardium: There is a trivial pericardial effusion.  Aorta: The aortic root is normal.  In comparison to the previous echocardiogram(s): There are no prior studies on this patient for comparison purposes.        CONCLUSIONS:   1. The left ventricular systolic function is hyperdynamic with a 70-75% estimated ejection fraction.   2. Poorly visualized anatomical structures due to suboptimal image quality.   3. Moderately increased left ventricular septal thickness.   4. The aorta is normal.     QUANTITATIVE DATA SUMMARY:  2D MEASUREMENTS:                            Normal Ranges:  LAs:           4.47 cm     (2.7-4.0cm)  RVIDd:         1.85 cm    (0.9-3.6cm)  IVSd:          1.74 cm    (0.6-1.1cm)  LVPWd:         1.46 cm    (0.6-1.1cm)  LVIDd:         4.12 cm    (3.9-5.9cm)  LVIDs:         2.19 cm  LV Mass Index: 128.9 g/m2  LV % FS        46.9 %     LA VOLUME:                                Normal Ranges:  LA Vol A4C:        63.8 ml    (22+/-6mL/m2)  LA Vol A2C:        32.0 ml  LA Vol BP:         45.9 ml  LA Vol Index A4C:  30.6 ml/m2  LA Vol Index A2C:  15.3 ml/m2  LA Vol Index BP:   22.0 ml/m2  LA Area A4C:       21.4 cm2  LA Area A2C:       14.9 cm2  LA Major Axis A4C: 6.1 cm  LA Major Axis A2C: 5.9 cm  LA Volume Index:   22.0 ml/m2  LA Vol A4C:        58.7 ml  LA Vol A2C:        31.6 ml     RA VOLUME BY A/L METHOD:                                Normal Ranges:  RA Vol A4C:        55.6 ml    (8.3-19.5ml)  RA Vol Index A4C:  26.7 ml/m2  RA Area A4C:       18.8 cm2  RA Major Axis A4C: 5.4 cm     M-MODE MEASUREMENTS:                   Normal Ranges:  Ao Root: 3.10 cm (2.0-3.7cm)  LAs:     5.16 cm (2.7-4.0cm)     AORTA MEASUREMENTS:                     Normal Ranges:  Asc Ao, d: 2.80 cm (2.1-3.4cm)     LV SYSTOLIC FUNCTION BY 2D PLANIMETRY (MOD):                      Normal Ranges:  EF-A4C View: 67.7 % (>55%)  EF-A2C View: 68.2 %     LV DIASTOLIC FUNCTION:                                Normal Ranges:  MV Peak E:        0.52 m/s    (0.7-1.2 m/s)  MV Peak A:        0.57 m/s    (0.42-0.7 m/s)  E/A Ratio:        0.90        (1.0-2.2)  MV e'             0.09 m/s    (>8.0)  MV lateral e'     0.09 m/s  MV medial e'      0.06 m/s  MV A Dur:         108.47 msec  E/e' Ratio:       5.74        (<8.0)  MV DT:            180 msec    (150-240 msec)  PulmV Sys Ari:    40.67 cm/s  PulmV Fontaine Ari:   41.62 cm/s  PulmV S/D Ari:    0.98  PulmV A Revs Ari: 38.85 cm/s  PulmV A Revs Dur: 152.24 msec     MITRAL VALVE:                       Normal Ranges:  MV DT:      180 msec (150-240msec)  MV PHT:     74 msec  (30-60msec)  MVA by PHT: 2.97  "cm2 (4-6cm2)     AORTIC VALVE:                                     Normal Ranges:  AoV Vmax:                1.72 m/s  (<1.7m/s)  AoV Peak P.9 mmHg (<20mmHg)  AoV Mean P.5 mmHg  (1.7-11.5mmHg)  LVOT Max Ari:            1.78 m/s  (<1.1m/s)  AoV VTI:                 34.29 cm  (18-25cm)  LVOT VTI:                40.62 cm  LVOT Diameter:           2.23 cm   (1.8-2.4cm)  AoV Area, VTI:           4.61 cm2  (2.5-5.5cm2)  AoV Area,Vmax:           4.01 cm2  (2.5-4.5cm2)  AoV Dimensionless Index: 1.18     RIGHT VENTRICLE:  RV 1   3.5 cm  RV 2   1.09 cm  RV 3   7.6 cm  TAPSE: 24.1 mm  RV s'  0.10 m/s     TRICUSPID VALVE/RVSP:                    Normal Ranges:  IVC Diam: 2.30 cm     PULMONIC VALVE:                          Normal Ranges:  PV Accel Time: 148 msec (>120ms)  PV Max Ari:    0.9 m/s  (0.6-0.9m/s)  PV Max PG:     3.2 mmHg     Pulmonary Veins:  PulmV A Revs Dur: 152.24 msec  PulmV A Revs Ari: 38.85 cm/s  PulmV Fontaine Ari:   41.62 cm/s  PulmV S/D Ari:    0.98  PulmV Sys Ari:    40.67 cm/s     AORTA:  Asc Ao Diam 2.75 cm        [id number] Gutierrez Posey MD  Electronically signed on 2022 at 10:18:06 AM       Labwork   Complete Blood Count  Lab Results   Component Value Date    WBC 4.9 2023    HGB 15.8 2023    HCT 47.9 2023    MCV 85 2023     2023       Peripheral Eosinophil Count/Percentage:   Eosinophils Absolute (x10E9/L)   Date Value   2020 0.12     Eosinophils % (%)   Date Value   2020 2.3       Serum Immunoglobulin E:    No results found for: \"IGE\"     ASSESSMENT/PLAN   Mr. Gómez is a 43 y.o. male, with no prior medical history, who is a never smoker, who presents to a [facility] for an initial/follow up evaluation for multifocal pneumonia and chronic cough.      Crackle lung sounds bilaterally. Multifocal pneumonia on CT imaging.  Organizing pneumonia vs Eosinophilia pneumonia and ILD are on the " differential diagnosis.    Problem List and Orders  Problem List Items Addressed This Visit    None  Visit Diagnoses       ILD (interstitial lung disease) (Multi)    -  Primary    Relevant Orders    ANCA-Associated Vasculitis Profile (ANCA,MPO,PR3)    FABIANA Panel    EMILIANA without Reflex FABIANA    Extended Myositis Panel    Aldolase    Citrulline Antibody, IgG    Rheumatoid Factor    Hypersensitivity Pneumonitis Panel    Multifocal pneumonia        Relevant Orders    CT chest wo IV contrast    Immunoglobulins (IgG, IgA, IgM)            Assessment and Plan / Recommendations:  Problem List Items Addressed This Visit    None     Multi focal Pneumonia  - will get Immunoglobulins  - will CBC w/diff  - Repeat CT in 4 weeks months    Crackle Lung sounds/Possible ILD  - will get ILD blood work up    Follow up in 4 weeks (after CT scan) or sooner if needed.    If you have any questions please call the office [phone]    Thank you for visiting the Pulmonary clinic today!   Capri Rice CNP  [phone]

## 2024-04-27 RX ORDER — DOXYCYCLINE 100 MG/1
100 TABLET ORAL 2 TIMES DAILY
Qty: 28 TABLET | Refills: 0 | OUTPATIENT
Start: 2024-04-27

## 2024-04-27 NOTE — PROGRESS NOTES
Wife informed me 4/26 cough has returned in the setting of abnml recent CT concerning for persistent PNA. Starting doxy 100 mg BID

## 2024-05-02 ENCOUNTER — OFFICE VISIT (OUTPATIENT)
Dept: PULMONOLOGY | Facility: CLINIC | Age: 44
End: 2024-05-02
Payer: COMMERCIAL

## 2024-05-02 ENCOUNTER — LAB (OUTPATIENT)
Dept: LAB | Facility: LAB | Age: 44
End: 2024-05-02
Payer: COMMERCIAL

## 2024-05-02 VITALS
TEMPERATURE: 98.2 F | OXYGEN SATURATION: 96 % | DIASTOLIC BLOOD PRESSURE: 85 MMHG | HEIGHT: 68 IN | SYSTOLIC BLOOD PRESSURE: 124 MMHG | BODY MASS INDEX: 31.83 KG/M2 | HEART RATE: 74 BPM | WEIGHT: 210 LBS

## 2024-05-02 DIAGNOSIS — J18.9 MULTIFOCAL PNEUMONIA: Primary | ICD-10-CM

## 2024-05-02 DIAGNOSIS — J18.9 MULTIFOCAL PNEUMONIA: ICD-10-CM

## 2024-05-02 DIAGNOSIS — J84.9 ILD (INTERSTITIAL LUNG DISEASE) (MULTI): ICD-10-CM

## 2024-05-02 LAB
CCP IGG SERPL-ACNC: <1 U/ML
CENTROMERE B AB SER-ACNC: <0.2 AI
CHROMATIN AB SERPL-ACNC: <0.2 AI
DSDNA AB SER-ACNC: <1 IU/ML
ENA JO1 AB SER QL IA: <0.2 AI
ENA RNP AB SER IA-ACNC: <0.2 AI
ENA SCL70 AB SER QL IA: <0.2 AI
ENA SM AB SER IA-ACNC: <0.2 AI
ENA SM+RNP AB SER QL IA: <0.2 AI
ENA SS-A AB SER IA-ACNC: <0.2 AI
ENA SS-B AB SER IA-ACNC: <0.2 AI
IGA SERPL-MCNC: 279 MG/DL (ref 70–400)
IGG SERPL-MCNC: 875 MG/DL (ref 700–1600)
IGM SERPL-MCNC: 71 MG/DL (ref 40–230)
RHEUMATOID FACT SER NEPH-ACNC: <10 IU/ML (ref 0–15)
RIBOSOMAL P AB SER-ACNC: <0.2 AI

## 2024-05-02 PROCEDURE — 86431 RHEUMATOID FACTOR QUANT: CPT

## 2024-05-02 PROCEDURE — 83516 IMMUNOASSAY NONANTIBODY: CPT

## 2024-05-02 PROCEDURE — 86606 ASPERGILLUS ANTIBODY: CPT

## 2024-05-02 PROCEDURE — 86036 ANCA SCREEN EACH ANTIBODY: CPT

## 2024-05-02 PROCEDURE — 86331 IMMUNODIFFUSION OUCHTERLONY: CPT

## 2024-05-02 PROCEDURE — 1036F TOBACCO NON-USER: CPT

## 2024-05-02 PROCEDURE — 82784 ASSAY IGA/IGD/IGG/IGM EACH: CPT

## 2024-05-02 PROCEDURE — 36415 COLL VENOUS BLD VENIPUNCTURE: CPT

## 2024-05-02 PROCEDURE — 86235 NUCLEAR ANTIGEN ANTIBODY: CPT

## 2024-05-02 PROCEDURE — 99204 OFFICE O/P NEW MOD 45 MIN: CPT

## 2024-05-02 PROCEDURE — 86225 DNA ANTIBODY NATIVE: CPT

## 2024-05-02 PROCEDURE — 82085 ASSAY OF ALDOLASE: CPT

## 2024-05-02 PROCEDURE — 84182 PROTEIN WESTERN BLOT TEST: CPT

## 2024-05-02 PROCEDURE — 86200 CCP ANTIBODY: CPT

## 2024-05-02 PROCEDURE — 86038 ANTINUCLEAR ANTIBODIES: CPT

## 2024-05-02 ASSESSMENT — PATIENT HEALTH QUESTIONNAIRE - PHQ9
2. FEELING DOWN, DEPRESSED OR HOPELESS: NOT AT ALL
SUM OF ALL RESPONSES TO PHQ9 QUESTIONS 1 AND 2: 0
1. LITTLE INTEREST OR PLEASURE IN DOING THINGS: NOT AT ALL

## 2024-05-02 ASSESSMENT — ENCOUNTER SYMPTOMS
OCCASIONAL FEELINGS OF UNSTEADINESS: 0
DEPRESSION: 0
LOSS OF SENSATION IN FEET: 0

## 2024-05-02 ASSESSMENT — PAIN SCALES - GENERAL: PAINLEVEL: 0-NO PAIN

## 2024-05-05 LAB — ALDOLASE SERPL-CCNC: 3.4 U/L (ref 1.2–7.6)

## 2024-05-08 LAB
A FUMIGATUS1 AB SER QL ID: NORMAL
A FUMIGATUS6 AB SER QL ID: NORMAL
A PULLULANS AB SER QL ID: NORMAL
ANCA AB PATTERN SER IF-IMP: NORMAL
ANCA IGG TITR SER IF: NORMAL {TITER}
MYELOPEROXIDASE AB SER-ACNC: 0 AU/ML (ref 0–19)
PIGEON SERUM AB QL ID: NORMAL
PROTEINASE3 AB SER-ACNC: 0 AU/ML (ref 0–19)
S RECTIVIRGULA AB SER QL ID: NORMAL

## 2024-05-10 LAB — ANA SER QL HEP2 SUBST: NEGATIVE

## 2024-05-14 LAB
ANA SER QL: NEGATIVE
ANNOTATION COMMENT IMP: NORMAL
EJ AB SER QL: NEGATIVE
ENA JO1 IGG SER-ACNC: 1 AU/ML (ref 0–40)
ENA SS-A 60KD AB SER-ACNC: 0 AU/ML (ref 0–40)
ENA SS-A IGG SER QL: 2 AU/ML (ref 0–40)
FIBRILLARIN AB SER QL: NEGATIVE
KU AB SER QL: NEGATIVE
MDA5 AB SER QL LINE BLOT: NEGATIVE
MI2 AB SER QL: NEGATIVE
MJ AB SER QL LINE BLOT: NEGATIVE
OJ AB SER QL: NEGATIVE
PL12 AB SER QL: NEGATIVE
PL7 AB SER QL: NEGATIVE
PM/SCL-100 AB SER QL LINE BLOT: NEGATIVE
SAE1 AB SER QL LINE BLOT: NEGATIVE
SRP AB SERPL QL: NEGATIVE
TIF1-GAMMA AB SER QL LINE BLOT: NEGATIVE
U1 SNRNP IGG SER-ACNC: 1 UNITS (ref 0–19)

## 2024-05-31 NOTE — PROGRESS NOTES
Patient: Osorio Gómez III    [id number]  : 1980 -- AGE 44 y.o.    Provider: Capri CHAPARRO CNP     Location [facility]   Service Date: 24              [facility]  Follow Up  Visit Note      HISTORY OF PRESENT ILLNESS     The patient's referring provider is: No ref. provider found    HISTORY OF PRESENT ILLNESS   Osorio Gómez III is a 44 y.o. male with no prior medical history, who is a never smoker, who presents to a [facility] for an initial/follow up evaluation for multifocal pneumonia and chronic cough.      I have independently interviewed and examined the patient in the office and reviewed available records.    Current History    Since last visit  OLDCART  cough  wheeze  Fevers/chills  BOOGIE  SOB at rest  chest pain  allergies  GERD  ED visits  Up to date on vaccines  Night time  Day time    24: On today's visit, the patient reports since the first of the year states he has had:  24: Zpack and medrol dose for cough x 3 weeks  3/8/34: pneumonia on CXR levaquin 500 mg for 10 days.   24: Cough returned started Doxy 100 mg. CT Chest.     He is accompanied by his wife today who claims he always has a dry nagging cough.  She reports that he coughs in his sleep.  He denies coughing or choking on food or liquids.  Occasional chest tightness  Denies fevers/chills, shortness of breath, emergency room visits or breathing issues  Has occasional GERD states with busy lifestyle sometimes he goes too long without eating and then will noticed acid reflux in the evening.  Denies joint swelling, redness and stiffness.    Bicuspid aortic valve    Previous pulmonary history: He has no history of recurrent infections, or lung disease as a child.  He had no previous lung hx, never on oxygen or inhaler therapy.     Inhalers/nebulized medications: never    Hospitalization History: He has not been hospitalized over the  last year for breathing related problem.    Sleep history: Sleep study showed mild sleep apnea, does not wear CPAP    ALLERGIES AND MEDICATIONS     ALLERGIES  No Known Allergies    MEDICATIONS  Current Outpatient Medications   Medication Sig Dispense Refill    doxycycline (Adoxa) 100 mg tablet Take 1 tablet (100 mg) by mouth 2 times a day. Take with a full glass of water and do not lie down for at least 30 minutes after 28 tablet 0     No current facility-administered medications for this visit.         PAST HISTORY     PAST MEDICAL HISTORY      PAST SURGICAL HISTORY  Past Surgical History:   Procedure Laterality Date    HERNIA REPAIR  03/22/2016    Hernia Repair    MR HEAD ANGIO WO IV CONTRAST  12/23/2022    MR HEAD ANGIO WO IV CONTRAST 12/23/2022 [facility]    OTHER SURGICAL HISTORY  08/23/2019    Achilles tendon surgery       IMMUNIZATION HISTORY    There is no immunization history on file for this patient.    SOCIAL HISTORY  Tobacco Smoking: never. No vaping.  Illicit drugs: none  Alcohol consumption: weekends drinks beer  Pets: none    OCCUPATIONAL/ENVIRONMENTAL HISTORY  Occupation:  for [employer].  No known exposure to asbestos, silica, beryllium or inhaled metals.  No exposure to birds or exotic animals.    FAMILY HISTORY    Mother- emphysema and COPD   No family history of cancer.  No family history of autoimmune disorders.    REVIEW OF SYSTEMS     REVIEW OF SYSTEMS  Review of Systems    Constitutional: No fever, no chills, no night sweats.    Eyes: No double vision, no floaters, no dry eyes.   ENT: See HPI.   Neck: No neck stiffness.  Cardiovascular: No sharp chest pain, no heart racing, no leg swelling.  Respiratory: as noted in HPI.   Gastrointestinal: No nausea, no vomiting, no diarrhea.   Musculoskeletal: No joint pain, no back pain.   Integumentary: No rashes or sores.  Neurological: No dizziness, no headaches. Sleeping well.  Psychiatric: No mood changes.   Endocrine: No hot  flashes, no cold intolerance, weight is stable.  Hematologic: No easy bruising or bleeding.    PHYSICAL EXAM     VITAL SIGNS:   There were no vitals filed for this visit.        CURRENT WEIGHT: There is no height or weight on file to calculate BMI.    PREVIOUS WEIGHTS:  Wt Readings from Last 3 Encounters:   05/02/24 95.3 kg (210 lb)   01/24/24 93.4 kg (206 lb)   07/19/23 97.7 kg (215 lb 6.4 oz)       Physical Exam    Constitutional: General appearance: Alert and oriented.  No acute distress. Well developed, well nourished.  Head and face: Symmetric  ENT: external inspection of ear and nose normal. No intranasal polyps. No oropharyngeal exudates.    Oropharynx: normal   Neck: supple, no lymphadenopathy  Pulmonary: Chest is normal. No increased work of breathing or signs of respiratory distress. Crackles mid and lower lobes bilaterally - no wheezing, or rhonchi.   Cardiovascular: Heart rate and rhythm normal. Normal S1, S2 - no murmurs, gallops, or pericardial rub.   Abdomen: Soft, non tender, +BS  Extremities: No edema. No clubbing or cyanosis of the fingernails.    Neurologic: Moves all four extremities   MSK: Normal movements of extremities. Gait normal   Psychiatric: Intact judgement and insight.    RESULTS/DATA     Pulmonary Function Test Results     No testing done.    Chest Radiograph     XR chest 2 views 03/22/2024    Narrative  Interpreted By:  Thanh Benavidez,  STUDY:  XR CHEST 2 VIEWS    INDICATION:  Signs/Symptoms:PNA f/up.    COMPARISON:  March 7    ACCESSION NUMBER(S):  [id number]    ORDERING CLINICIAN:  BRITTANY BEHM    FINDINGS:  Bilateral basilar multifocal opacities consistent with pneumonia  unchanged from prior study.    No effusion or pneumothorax.    Impression  Bilateral multifocal basilar pneumonia unchanged.    Signed by: Thanh Benavidez 3/23/2024 4:39 PM  Dictation workstation:   [id number]      Chest CT Scan     CT chest wo IV contrast - 4/13/24  Status: Final result     PACS Images     Show  images for CT chest wo IV contrast  Signed by    Signed Time Phone Pager   Indio Camp MD 4/16/2024 15:32 [phone]     Exam Information    Status Exam Begun Exam Ended   Final 4/13/2024 12:55 4/13/2024 13:02     Study Result    Narrative & Impression   Interpreted By:  Indio Camp,   STUDY:  CT CHEST WO IV CONTRAST;  4/13/2024 1:02 pm      INDICATION:  Signs/Symptoms:multifocal PNA.      COMPARISON:  03/22/2024 PA and lateral chest x-ray and the 08/05/2022 cardiac  scoring CT.      ACCESSION NUMBER(S):  [id number]      ORDERING CLINICIAN:  BRITTANY BEHM      TECHNIQUE:  Helical data acquisition of the chest was obtained without IV  contrast material.  Images were reformatted in axial, coronal, and  sagittal planes.      FINDINGS:  LUNGS AND AIRWAYS:  The trachea and central airways are patent. No endobronchial lesion.      Predominantly ground-glass infiltrates are most extensive in the  lower lobes, where a lesser amount of dense airspace consolidation is  seen on the left. Lesser ground-glass changes in the upper lobes,  middle lobe and lingula, moderate dense airspace consolidation with  air bronchograms in the posterior middle lobe and bilateral linear  and bandlike densities are noted. No pleural effusion. A medial left  major fissure micronodule on image 149 series 202 is consistent with  a lymph node. Image 115 series 202 shows a very minute solid  posteromedial right upper lobe nodule. The great majority of such  incidental micronodules are benign. It might be followed in 12  months, especially if the patient has a smoking history or is  otherwise at risk for a malignancy.      MEDIASTINUM AND ISABELLE, LOWER NECK AND AXILLA:  Normal size homogeneous thyroid.      No evidence of thoracic lymphadenopathy by CT criteria.      Esophagus appears within normal limits as seen.      HEART AND VESSELS:  No thoracic aortic aneurysm. Minimal vascular calcification.      Main pulmonary artery  and its branches are normal in caliber.      No coronary artery calcifications are seen. The study is not  optimized for evaluation of coronary arteries.      The cardiac chambers are not enlarged.      No evidence of pericardial effusion.      UPPER ABDOMEN:  Left and right colon diverticulosis without evidence of  diverticulitis is noted.      CHEST WALL AND OSSEOUS STRUCTURES:  There are no suspicious osseous lesions.      IMPRESSION:  1. Bilateral ground-glass and airspace infiltrates consistent with  the history of a pneumonia.  2. Right upper lobe micronodule.  3. Diverticulosis.      MACRO:  None       Echocardiogram     Study Date:       8/5/2022             Referring Physician: BRITTANY BEHM  MRN/PID:          [id number]             PCP:  Accession/Order#: [id number]         Department Location: [facility]                                                              Echo Lab  YOB: 1980             Fellow:  Gender:           M                    Nurse:  Admit Date:                            Sonographer:         Barbara Guerrier [facility]  Admission Status: Outpatient           Additional Staff:  Height:           170.18 cm            CC Report to:  Weight:           97.52 kg             Study Type:          Echocardiogram  BSA:              2.09 m2  Blood Pressure: 115 /82 mmHg     Diagnosis/ICD: 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     Study Detail: The following Echo studies were performed: 2D, M-Mode, Doppler and                color flow.        PHYSICIAN INTERPRETATION:  Left Ventricle: The left ventricular systolic function is hyperdynamic, with an estimated ejection  fraction of 70-75%. There are no regional wall motion abnormalities. The left ventricular cavity size is normal. The left ventricular septal wall thickness is moderately increased. There is mildly increased left ventricular posterior wall thickness. There is mild to moderate concentric left ventricular hypertrophy. Spectral Doppler shows a normal pattern of left ventricular diastolic filling. There is a left ventricular outflow tract obstruction with the Valsalva maneuver. The provoked gradient is 29 mmHg.  Left Atrium: The left atrium is normal in size.  Right Ventricle: The right ventricle is normal in size. There is normal right ventricular global systolic function.  Right Atrium: The right atrium is upper limits of normal in size.  Aortic Valve: The aortic valve appears abnormal. The number of aortic cusps could not be determined from this study. There are increased aortic valve velocities due to increased flow/dynamic ejection. There is trace to mild aortic valve regurgitation. The peak instantaneous gradient of the aortic valve is 11.9 mmHg. The mean gradient of the aortic valve is 6.5 mmHg. The aorta is normal.  Mitral Valve: The mitral valve is normal in structure. There is mild systolic anterior motion with obstructive septal contact. There is trace mitral valve regurgitation.  Tricuspid Valve: The tricuspid valve is structurally normal. There is trace tricuspid regurgitation. The right ventricular systolic pressure is unable to be estimated.  Pulmonic Valve: The pulmonic valve is structurally normal. There is physiologic pulmonic valve regurgitation.  Pericardium: There is a trivial pericardial effusion.  Aorta: The aortic root is normal.  In comparison to the previous echocardiogram(s): There are no prior studies on this patient for comparison purposes.        CONCLUSIONS:   1. The left ventricular systolic function is hyperdynamic with a 70-75% estimated ejection fraction.   2. Poorly visualized  anatomical structures due to suboptimal image quality.   3. Moderately increased left ventricular septal thickness.   4. The aorta is normal.     QUANTITATIVE DATA SUMMARY:  2D MEASUREMENTS:                            Normal Ranges:  LAs:           4.47 cm    (2.7-4.0cm)  RVIDd:         1.85 cm    (0.9-3.6cm)  IVSd:          1.74 cm    (0.6-1.1cm)  LVPWd:         1.46 cm    (0.6-1.1cm)  LVIDd:         4.12 cm    (3.9-5.9cm)  LVIDs:         2.19 cm  LV Mass Index: 128.9 g/m2  LV % FS        46.9 %     LA VOLUME:                                Normal Ranges:  LA Vol A4C:        63.8 ml    (22+/-6mL/m2)  LA Vol A2C:        32.0 ml  LA Vol BP:         45.9 ml  LA Vol Index A4C:  30.6 ml/m2  LA Vol Index A2C:  15.3 ml/m2  LA Vol Index BP:   22.0 ml/m2  LA Area A4C:       21.4 cm2  LA Area A2C:       14.9 cm2  LA Major Axis A4C: 6.1 cm  LA Major Axis A2C: 5.9 cm  LA Volume Index:   22.0 ml/m2  LA Vol A4C:        58.7 ml  LA Vol A2C:        31.6 ml     RA VOLUME BY A/L METHOD:                                Normal Ranges:  RA Vol A4C:        55.6 ml    (8.3-19.5ml)  RA Vol Index A4C:  26.7 ml/m2  RA Area A4C:       18.8 cm2  RA Major Axis A4C: 5.4 cm     M-MODE MEASUREMENTS:                   Normal Ranges:  Ao Root: 3.10 cm (2.0-3.7cm)  LAs:     5.16 cm (2.7-4.0cm)     AORTA MEASUREMENTS:                     Normal Ranges:  Asc Ao, d: 2.80 cm (2.1-3.4cm)     LV SYSTOLIC FUNCTION BY 2D PLANIMETRY (MOD):                      Normal Ranges:  EF-A4C View: 67.7 % (>55%)  EF-A2C View: 68.2 %     LV DIASTOLIC FUNCTION:                                Normal Ranges:  MV Peak E:        0.52 m/s    (0.7-1.2 m/s)  MV Peak A:        0.57 m/s    (0.42-0.7 m/s)  E/A Ratio:        0.90        (1.0-2.2)  MV e'             0.09 m/s    (>8.0)  MV lateral e'     0.09 m/s  MV medial e'      0.06 m/s  MV A Dur:         108.47 msec  E/e' Ratio:       5.74        (<8.0)  MV DT:            180 msec    (150-240 msec)  PulmV Sys Ari:    40.67  "cm/s  PulmV Fontaine Ari:   41.62 cm/s  PulmV S/D Ari:    0.98  PulmV A Revs Ari: 38.85 cm/s  PulmV A Revs Dur: 152.24 msec     MITRAL VALVE:                       Normal Ranges:  MV DT:      180 msec (150-240msec)  MV PHT:     74 msec  (30-60msec)  MVA by PHT: 2.97 cm2 (4-6cm2)     AORTIC VALVE:                                     Normal Ranges:  AoV Vmax:                1.72 m/s  (<1.7m/s)  AoV Peak P.9 mmHg (<20mmHg)  AoV Mean P.5 mmHg  (1.7-11.5mmHg)  LVOT Max Ari:            1.78 m/s  (<1.1m/s)  AoV VTI:                 34.29 cm  (18-25cm)  LVOT VTI:                40.62 cm  LVOT Diameter:           2.23 cm   (1.8-2.4cm)  AoV Area, VTI:           4.61 cm2  (2.5-5.5cm2)  AoV Area,Vmax:           4.01 cm2  (2.5-4.5cm2)  AoV Dimensionless Index: 1.18     RIGHT VENTRICLE:  RV 1   3.5 cm  RV 2   1.09 cm  RV 3   7.6 cm  TAPSE: 24.1 mm  RV s'  0.10 m/s     TRICUSPID VALVE/RVSP:                    Normal Ranges:  IVC Diam: 2.30 cm     PULMONIC VALVE:                          Normal Ranges:  PV Accel Time: 148 msec (>120ms)  PV Max Ari:    0.9 m/s  (0.6-0.9m/s)  PV Max PG:     3.2 mmHg     Pulmonary Veins:  PulmV A Revs Dur: 152.24 msec  PulmV A Revs Ari: 38.85 cm/s  PulmV Fontaine Ari:   41.62 cm/s  PulmV S/D Ari:    0.98  PulmV Sys Ari:    40.67 cm/s     AORTA:  Asc Ao Diam 2.75 cm        [id number] Gutierrez Posey MD  Electronically signed on 2022 at 10:18:06 AM       Labwork   Complete Blood Count  Lab Results   Component Value Date    WBC 4.9 2023    HGB 15.8 2023    HCT 47.9 2023    MCV 85 2023     2023       Peripheral Eosinophil Count/Percentage:   Eosinophils Absolute (x10E9/L)   Date Value   2020 0.12     Eosinophils % (%)   Date Value   2020 2.3       Serum Immunoglobulin E:    No results found for: \"IGE\"     ASSESSMENT/PLAN   Mr. Gómez is a 44 y.o. male, with no prior medical history, who is a never smoker, who [facility] for an initial/follow up evaluation for multifocal pneumonia and chronic cough.      Crackle lung sounds bilaterally. Multifocal pneumonia on CT imaging.  Organizing pneumonia vs Eosinophilia pneumonia and ILD are on the differential diagnosis.    CT Scan looks a little imrproved.   Labs work is all negative    Problem List and Orders  Problem List Items Addressed This Visit    None        Assessment and Plan / Recommendations:  Problem List Items Addressed This Visit    None     Multi focal Pneumonia  - will get Immunoglobulins  - will CBC w/diff  - Repeat CT in 4 weeks months    Crackle Lung sounds/Possible ILD  - will get ILD blood work up    Follow up in 4 weeks (after CT scan) or sooner if needed.    If you have any questions please call the office [phone]    Thank you for visiting the Pulmonary clinic today!   Capri Rice CNP  [phone]

## 2024-06-03 ENCOUNTER — HOSPITAL ENCOUNTER (OUTPATIENT)
Dept: RADIOLOGY | Facility: CLINIC | Age: 44
Discharge: HOME | End: 2024-06-03
Payer: COMMERCIAL

## 2024-06-03 DIAGNOSIS — J18.9 MULTIFOCAL PNEUMONIA: ICD-10-CM

## 2024-06-03 PROCEDURE — 71250 CT THORAX DX C-: CPT

## 2024-06-03 PROCEDURE — 71250 CT THORAX DX C-: CPT | Performed by: RADIOLOGY

## 2024-06-05 ENCOUNTER — APPOINTMENT (OUTPATIENT)
Dept: PULMONOLOGY | Facility: CLINIC | Age: 44
End: 2024-06-05
Payer: COMMERCIAL

## 2024-06-05 DIAGNOSIS — J18.9 MULTIFOCAL PNEUMONIA: Primary | ICD-10-CM

## 2024-06-05 DIAGNOSIS — J84.89 ORGANIZING PNEUMONIA (MULTI): ICD-10-CM

## 2024-06-05 RX ORDER — PREDNISONE 10 MG/1
TABLET ORAL
Qty: 30 TABLET | Refills: 0 | Status: SHIPPED | OUTPATIENT
Start: 2024-06-05 | End: 2024-07-05

## 2024-06-05 NOTE — PROGRESS NOTES
Spoke with Mr. Gómez. Discussed results of bloodwork and CT scan. Plan to do a prednisone taper for 2 weeks with repeat of CT Chest in 3 months. He is in agreement of plan.

## 2024-09-05 ENCOUNTER — HOSPITAL ENCOUNTER (OUTPATIENT)
Dept: RADIOLOGY | Facility: CLINIC | Age: 44
Discharge: HOME | End: 2024-09-05
Payer: COMMERCIAL

## 2024-09-05 DIAGNOSIS — J18.9 MULTIFOCAL PNEUMONIA: ICD-10-CM

## 2024-09-05 PROCEDURE — 71250 CT THORAX DX C-: CPT
